# Patient Record
Sex: FEMALE | Race: BLACK OR AFRICAN AMERICAN | NOT HISPANIC OR LATINO | Employment: FULL TIME | URBAN - METROPOLITAN AREA
[De-identification: names, ages, dates, MRNs, and addresses within clinical notes are randomized per-mention and may not be internally consistent; named-entity substitution may affect disease eponyms.]

---

## 2021-11-11 ENCOUNTER — CLINICAL SUPPORT (OUTPATIENT)
Dept: OTHER | Facility: CLINIC | Age: 31
End: 2021-11-11

## 2021-11-11 DIAGNOSIS — Z00.8 ENCOUNTER FOR OTHER GENERAL EXAMINATION: ICD-10-CM

## 2021-11-12 VITALS — HEIGHT: 67 IN

## 2021-11-12 LAB
GLUCOSE SERPL-MCNC: 87 MG/DL (ref 60–140)
HDLC SERPL-MCNC: 56 MG/DL
POC CHOLESTEROL, TOTAL: 157 MG/DL
TRIGL SERPL-MCNC: 602 MG/DL

## 2023-06-27 DIAGNOSIS — M79.671 RIGHT FOOT PAIN: ICD-10-CM

## 2023-06-27 DIAGNOSIS — M25.571 RIGHT ANKLE PAIN, UNSPECIFIED CHRONICITY: ICD-10-CM

## 2023-07-14 ENCOUNTER — HOSPITAL ENCOUNTER (OUTPATIENT)
Dept: RADIOLOGY | Facility: HOSPITAL | Age: 33
Discharge: HOME | End: 2023-07-14
Attending: STUDENT IN AN ORGANIZED HEALTH CARE EDUCATION/TRAINING PROGRAM
Payer: COMMERCIAL

## 2023-07-14 ENCOUNTER — OFFICE VISIT (OUTPATIENT)
Dept: SPORTS MEDICINE | Facility: CLINIC | Age: 33
End: 2023-07-14
Payer: COMMERCIAL

## 2023-07-14 VITALS — HEIGHT: 67 IN | BODY MASS INDEX: 28.25 KG/M2 | WEIGHT: 180 LBS

## 2023-07-14 DIAGNOSIS — M25.373 CHRONIC INSTABILITY OF ANKLE: ICD-10-CM

## 2023-07-14 DIAGNOSIS — M25.571 RIGHT ANKLE PAIN, UNSPECIFIED CHRONICITY: ICD-10-CM

## 2023-07-14 DIAGNOSIS — M76.821 POSTERIOR TIBIAL TENDON DYSFUNCTION (PTTD) OF RIGHT LOWER EXTREMITY: Primary | ICD-10-CM

## 2023-07-14 PROCEDURE — 3008F PR BODY MASS INDEX (BMI) DOCUMENTED: ICD-10-PCS | Mod: CPTII,S$GLB,, | Performed by: STUDENT IN AN ORGANIZED HEALTH CARE EDUCATION/TRAINING PROGRAM

## 2023-07-14 PROCEDURE — 99999 PR PBB SHADOW E&M-EST. PATIENT-LVL III: ICD-10-PCS | Mod: PBBFAC,,, | Performed by: STUDENT IN AN ORGANIZED HEALTH CARE EDUCATION/TRAINING PROGRAM

## 2023-07-14 PROCEDURE — 1159F MED LIST DOCD IN RCRD: CPT | Mod: CPTII,S$GLB,, | Performed by: STUDENT IN AN ORGANIZED HEALTH CARE EDUCATION/TRAINING PROGRAM

## 2023-07-14 PROCEDURE — 99204 PR OFFICE/OUTPT VISIT, NEW, LEVL IV, 45-59 MIN: ICD-10-PCS | Mod: S$GLB,,, | Performed by: STUDENT IN AN ORGANIZED HEALTH CARE EDUCATION/TRAINING PROGRAM

## 2023-07-14 PROCEDURE — 99999 PR PBB SHADOW E&M-EST. PATIENT-LVL III: CPT | Mod: PBBFAC,,, | Performed by: STUDENT IN AN ORGANIZED HEALTH CARE EDUCATION/TRAINING PROGRAM

## 2023-07-14 PROCEDURE — 73610 XR ANKLE COMPLETE 3 VIEW RIGHT: ICD-10-PCS | Mod: 26,RT,, | Performed by: RADIOLOGY

## 2023-07-14 PROCEDURE — 1159F PR MEDICATION LIST DOCUMENTED IN MEDICAL RECORD: ICD-10-PCS | Mod: CPTII,S$GLB,, | Performed by: STUDENT IN AN ORGANIZED HEALTH CARE EDUCATION/TRAINING PROGRAM

## 2023-07-14 PROCEDURE — 99204 OFFICE O/P NEW MOD 45 MIN: CPT | Mod: S$GLB,,, | Performed by: STUDENT IN AN ORGANIZED HEALTH CARE EDUCATION/TRAINING PROGRAM

## 2023-07-14 PROCEDURE — 73610 X-RAY EXAM OF ANKLE: CPT | Mod: TC,RT

## 2023-07-14 PROCEDURE — 3008F BODY MASS INDEX DOCD: CPT | Mod: CPTII,S$GLB,, | Performed by: STUDENT IN AN ORGANIZED HEALTH CARE EDUCATION/TRAINING PROGRAM

## 2023-07-14 PROCEDURE — 73610 X-RAY EXAM OF ANKLE: CPT | Mod: 26,RT,, | Performed by: RADIOLOGY

## 2023-07-14 NOTE — PROGRESS NOTES
Patient ID: Cristina Salinas  YOB: 1990  MRN: [id number]    Chief Complaint: Pain of the Right Ankle    Referred By: Gume MADRIGAL for right ankle     History of Present Illness: Cristina Salinas is a 33 y.o. female who presents today with right ankle.     The patient is active in  jogging, resistance training .  Occupation:     Cristina Salinas states it is Acute on chronic in nature and there was a specific mechanism. She has had chronic ankle pain dating back to high school sports injury. She has had chronic instability episodes over the years and previously been treated at [facility] where she got intra-articular corticosteroid injection January 2023 to her recollection. This improved her symptoms. More recently she had an inversion sprain stepping in a hole on spring break that aggravated her symptoms.  Cristina Salinas describes the pain as a intermittent throb at medial ankle. Treatment to date includes ice, heat, rest, PT,corticosteroid injection. They believe that they are better with this treatment. Current pain level at rest is 5/10, pain level at worst is 10/10 and pain level at best is 2/10 (Numeric Pain Rating Scale).  Associated symptoms include: Swelling Yes, Instability Yes, Pain that affects your sleep Yes, Mechanical Yes, locking/catching No, Neurological Yes to hallux, limited range of motion Yes.     Aggravating activities include prolong standing, dynamic activity.     They admits to formal physical therapy for this. Last physical therapy was 5 months ago.      No results found for: HGBA1C      Past Medical History:   No past medical history on file.  No past surgical history on file.  No family history on file.  Social History     Socioeconomic History    Marital status: Unknown   Tobacco Use    Smoking status: Never    Smokeless tobacco: Never       Review of patient's allergies indicates:  No Known Allergies    Physical Exam:   Body mass index is 28.19  kg/m².    GENERAL: Well appearing, in no acute distress.  HEAD: Normocephalic and atraumatic.  ENT: External ears and nose grossly normal.  EYES: EOMI bilaterally  PULMONARY: Respirations are grossly even and non-labored.  NEURO: Awake, alert, and oriented x 3.  SKIN: No obvious rashes appreciated.  PSYCH: Mood & affect are appropriate.    Detailed MSK exam:   Pes planus, too many toes sign, no ecchymosis, no erythema. No effusion. Full ROM, full strength without pain. Tenderness at fibular head No. Tenderness at proximal fibular shaft No . Syndesmotic tenderness negative. Tender at posteromedial tibial gutter  Squeeze positive. Bump negative. Talar tilt at neutral negative, at plantar flexion negative, at dorsiflexion negative. Kleiger negative. Feet together, deep squat negative. Anterior Drawer negative. Read negative. Double heel raise positive pain,  Single leg heel raise able to complete.     Imaging:  X-Ray Ankle Complete Right  Narrative: EXAMINATION:  Three views right ankle    CLINICAL HISTORY:  Pain    COMPARISON:  None    FINDINGS:  Weightbearing views show no fracture, dislocation or aggressive osseous process.  No talar dome lesions.  Soft tissues are normal.  Impression: No abnormal osseous findings    Electronically signed by: Lonnie Corona MD  Date:    07/14/2023  Time:    13:51      Relevant imaging results were reviewed and interpreted by me and per my read as above.  This was discussed with the patient and / or family today.     Assessment:  Cristina Salinas is a 33 y.o. female presents today for right ankle pain most consistent with posterior tibialis tendon insufficiency.  Likely stage I to with some mild pes planus appreciated.  Discussed the diagnosis prognosis as well as conservative treatment options moving forward.  Discussed proper insoles proper shoe wear strengthening work she is done multiple rounds of therapy in the past.  She does have an outside MRI from earlier in the year that I  would like to look at and have the read for as well.  Discussed the role of potential ortho biologics in the future as well as if not seeing improvements.  She will send me the MRI we will discuss further on further interventions.    Posterior tibial tendon dysfunction (PTTD) of right lower extremity    Chronic instability of ankle         A copy of today's visit note has been sent to the referring provider.       Jens Young MD    Disclaimer: This note was prepared using a voice recognition system and is likely to have sound alike errors within the text.

## 2023-07-14 NOTE — PATIENT INSTRUCTIONS
Assessment:  Cristina Salinas is a 33 y.o. female   Chief Complaint   Patient presents with    Right Ankle - Pain       Encounter Diagnoses   Name Primary?    Posterior tibial tendon dysfunction (PTTD) of right lower extremity Yes    Chronic instability of ankle         Plan:  Reviewed your x-rays with you today and discussed pertinent findings.   Good insoles and good support. Recommend power step and super feet insoles over others. Maria T Devi Brooks.   Would want to look at MRI, please bring this and the report for us to review  Continue foot and ankle strengthening.  Discussed interventional options as well including PRP and stem cells.     Follow-up: As needed or sooner if there are any problems between now and then.    Thank you for choosing [facility] and Dr. Jens Young for your orthopedic & sports medicine care. It is our goal to provide you with exceptional care that will help keep you healthy, active, and get you back in the game.    Please do not hesitate to reach out to us via email, phone, or MyChart with any questions, concerns, or feedback.    If you felt that you received exemplary care today, please consider leaving us feedback on Health[employer] at:  [email or web address]    If you are experiencing pain/discomfort ,or have questions after 5pm and would like to be connected to the [facility] on-call team, please call this number and specify which Sports Medicine provider is treating you: [phone]

## 2024-12-12 ENCOUNTER — PATIENT MESSAGE (OUTPATIENT)
Dept: RESEARCH | Facility: HOSPITAL | Age: 34
End: 2024-12-12
Payer: COMMERCIAL